# Patient Record
(demographics unavailable — no encounter records)

---

## 2025-04-23 NOTE — PHYSICAL EXAM
[de-identified] : There is no pain on range of motion of her left shoulder.  She has full painless flexion extension pronation and supination of the left elbow and wrist.  There is no localized tenderness about the shoulder elbow or wrist.  There is no ecchymosis.  Range of motion of the left ring and pinky fingers is guarded. [de-identified] : AP and lateral x-rays are obtained including the left wrist hand and fingers.  There is no evidence of any fracture.

## 2025-04-23 NOTE — DISCUSSION/SUMMARY
[de-identified] : She sustained a sprain of her left ring and pinky fingers.  She will rest and restrict her activity.  I will see her for follow-up in a week or 10 days if her symptoms have not fully resolved.

## 2025-04-23 NOTE — HISTORY OF PRESENT ILLNESS
[de-identified] : This 8-year-old girl fell off her scooter yesterday and initially had severe diffuse pain in her left arm which after an hour or so localized to the fingers of her left hand.  She had 2 tiny lacerations of 2 mm or so at the volar surface of the PIP joints of her left ring and pinky fingers.  Initially it was very difficult to localize the problem.  She comes in today for further evaluation.

## 2025-04-23 NOTE — PHYSICAL EXAM
[de-identified] : There is no pain on range of motion of her left shoulder.  She has full painless flexion extension pronation and supination of the left elbow and wrist.  There is no localized tenderness about the shoulder elbow or wrist.  There is no ecchymosis.  Range of motion of the left ring and pinky fingers is guarded. [de-identified] : AP and lateral x-rays are obtained including the left wrist hand and fingers.  There is no evidence of any fracture.

## 2025-04-23 NOTE — HISTORY OF PRESENT ILLNESS
[de-identified] : This 8-year-old girl fell off her scooter yesterday and initially had severe diffuse pain in her left arm which after an hour or so localized to the fingers of her left hand.  She had 2 tiny lacerations of 2 mm or so at the volar surface of the PIP joints of her left ring and pinky fingers.  Initially it was very difficult to localize the problem.  She comes in today for further evaluation.

## 2025-04-23 NOTE — DISCUSSION/SUMMARY
[de-identified] : She sustained a sprain of her left ring and pinky fingers.  She will rest and restrict her activity.  I will see her for follow-up in a week or 10 days if her symptoms have not fully resolved.